# Patient Record
Sex: FEMALE | Race: WHITE | URBAN - METROPOLITAN AREA
[De-identification: names, ages, dates, MRNs, and addresses within clinical notes are randomized per-mention and may not be internally consistent; named-entity substitution may affect disease eponyms.]

---

## 2022-10-24 ENCOUNTER — OFFICE VISIT (OUTPATIENT)
Dept: URBAN - METROPOLITAN AREA CLINIC 86 | Facility: CLINIC | Age: 47
End: 2022-10-24

## 2022-10-24 ENCOUNTER — WEB ENCOUNTER (OUTPATIENT)
Dept: URBAN - METROPOLITAN AREA CLINIC 86 | Facility: CLINIC | Age: 47
End: 2022-10-24

## 2022-10-24 VITALS
WEIGHT: 179.2 LBS | DIASTOLIC BLOOD PRESSURE: 55 MMHG | HEIGHT: 70 IN | TEMPERATURE: 97.2 F | BODY MASS INDEX: 25.65 KG/M2 | HEART RATE: 66 BPM | SYSTOLIC BLOOD PRESSURE: 109 MMHG

## 2022-10-24 DIAGNOSIS — R93.2 ABNORMAL FINDING ON IMAGING OF LIVER: ICD-10-CM

## 2022-10-24 DIAGNOSIS — Z12.11 SCREENING FOR COLON CANCER: ICD-10-CM

## 2022-10-24 DIAGNOSIS — K76.89 HEPATIC CYST: ICD-10-CM

## 2022-10-24 DIAGNOSIS — Z71.85 VACCINE COUNSELING: ICD-10-CM

## 2022-10-24 RX ORDER — SERTRALINE HYDROCHLORIDE 50 MG/1
1 TABLET TABLET, FILM COATED ORAL ONCE A DAY
Status: ACTIVE | COMMUNITY

## 2022-10-24 NOTE — PHYSICAL EXAM HENT:
Head,  normocephalic,  atraumatic,  Face,  Face within normal limits,  Ears,  External ears within normal limits,  Nose/Nasopharynx and mouth: patient wearing mask

## 2022-10-24 NOTE — HPI-TODAY'S VISIT:
This is a 47 year old female referred by Prasanna Nielsen, for evaluation of cysts on the liver.  10/24/22 office visit  Pt with pmh including anxiety/depression presents today due to liver cyst cound on recent CT scan. Scan was done due to rlq/suprapubic pain, hematuria.  Labs; none [facility] imaging: CT done with [facility] viewed on pts phone showing 10 mm liver cyst. No suspicious features and otherwise unremarkable. will request records.   PMH: anxiety/depression Social hx wine nightly and discussed. Surgeries: none FH: denies fh of liver diease, gi disease   Discussed dx and that do not feel it is causing her pain given pain is in right lower quadrant/suprapublic area. Will check labs to rule out silent causes for liver disease and given no suspicious features will repeat us in 6 months to evaluate

## 2022-10-25 ENCOUNTER — TELEPHONE ENCOUNTER (OUTPATIENT)
Dept: URBAN - METROPOLITAN AREA CLINIC 86 | Facility: CLINIC | Age: 47
End: 2022-10-25

## 2022-10-25 PROBLEM — 443913008: Status: ACTIVE | Noted: 2022-10-23

## 2022-10-25 LAB
A/G RATIO: 1.6
ABSOLUTE BASOPHILS: 39
ABSOLUTE EOSINOPHILS: 143
ABSOLUTE LYMPHOCYTES: 1562
ABSOLUTE MONOCYTES: 506
ABSOLUTE NEUTROPHILS: 3251
ALBUMIN: 4.1
ALKALINE PHOSPHATASE: 61
ALT (SGPT): 15
AST (SGOT): 18
BASOPHILS: 0.7
BILIRUBIN, TOTAL: 0.4
BUN/CREATININE RATIO: (no result)
BUN: 19
CALCIUM: 9.3
CARBON DIOXIDE, TOTAL: 27
CHLORIDE: 106
CREATININE: 0.82
EGFR: 89
EOSINOPHILS: 2.6
GLOBULIN, TOTAL: 2.5
GLUCOSE: 87
HEMATOCRIT: 39.2
HEMOGLOBIN: 12.9
HEPATITIS A AB, TOTAL: REACTIVE
HEPATITIS B CORE AB TOTAL: (no result)
HEPATITIS B SURFACE AB IMMUNITY, QN: <5
HEPATITIS B SURFACE ANTIGEN: (no result)
HEPATITIS C ANTIBODY: (no result)
LYMPHOCYTES: 28.4
MCH: 29.3
MCHC: 32.9
MCV: 89.1
MONOCYTES: 9.2
MPV: 11.1
NEUTROPHILS: 59.1
PLATELET COUNT: 289
POTASSIUM: 4.3
PROTEIN, TOTAL: 6.6
RDW: 11.9
RED BLOOD CELL COUNT: 4.4
SIGNAL TO CUT-OFF: 0.05
SODIUM: 140
WHITE BLOOD CELL COUNT: 5.5

## 2022-11-08 ENCOUNTER — OFFICE VISIT (OUTPATIENT)
Dept: URBAN - METROPOLITAN AREA CLINIC 91 | Facility: CLINIC | Age: 47
End: 2022-11-08

## 2022-11-21 ENCOUNTER — OFFICE VISIT (OUTPATIENT)
Dept: URBAN - METROPOLITAN AREA CLINIC 92 | Facility: CLINIC | Age: 47
End: 2022-11-21
Payer: COMMERCIAL

## 2022-11-21 VITALS
HEIGHT: 70 IN | BODY MASS INDEX: 25.34 KG/M2 | WEIGHT: 177 LBS | HEART RATE: 77 BPM | TEMPERATURE: 97 F | SYSTOLIC BLOOD PRESSURE: 103 MMHG | DIASTOLIC BLOOD PRESSURE: 63 MMHG

## 2022-11-21 DIAGNOSIS — Z12.11 COLON CANCER SCREENING: ICD-10-CM

## 2022-11-21 PROCEDURE — 99212 OFFICE O/P EST SF 10 MIN: CPT

## 2022-11-21 RX ORDER — SERTRALINE HYDROCHLORIDE 50 MG/1
1 TABLET TABLET, FILM COATED ORAL ONCE A DAY
Status: ACTIVE | COMMUNITY

## 2022-11-21 NOTE — HPI-TODAY'S VISIT:
47 year-old female who presents for a colon cancer screening.   Last colonoscopy: never  No family history of colon polyps or colon cancer.  Patient denies nausea, heartburn, dysphagia, abdominal pain, rectal bleeding, melena, unintentional weight loss, changes in bowel habits and loss of appetite. Has 2 BM on avg, normal. Stacy denies blood thinner use, pacemaker/defibrillator, diabetes, kidney disease, and home O2.  She is seeing liver center for liver cyst recently noted on CT.

## 2022-12-02 ENCOUNTER — DASHBOARD ENCOUNTERS (OUTPATIENT)
Age: 47
End: 2022-12-02

## 2022-12-06 ENCOUNTER — OFFICE VISIT (OUTPATIENT)
Dept: URBAN - METROPOLITAN AREA CLINIC 86 | Facility: CLINIC | Age: 47
End: 2022-12-06

## 2022-12-06 VITALS — HEIGHT: 70 IN | WEIGHT: 177 LBS | BODY MASS INDEX: 25.34 KG/M2

## 2022-12-06 RX ORDER — SERTRALINE HYDROCHLORIDE 50 MG/1
1 TABLET TABLET, FILM COATED ORAL ONCE A DAY
Status: ACTIVE | COMMUNITY

## 2022-12-23 PROBLEM — 305058001: Status: ACTIVE | Noted: 2022-10-24

## 2023-01-13 ENCOUNTER — WEB ENCOUNTER (OUTPATIENT)
Dept: URBAN - METROPOLITAN AREA SURGERY CENTER 16 | Facility: SURGERY CENTER | Age: 48
End: 2023-01-13

## 2023-01-19 ENCOUNTER — CLAIMS CREATED FROM THE CLAIM WINDOW (OUTPATIENT)
Dept: URBAN - METROPOLITAN AREA SURGERY CENTER 16 | Facility: SURGERY CENTER | Age: 48
End: 2023-01-19
Payer: COMMERCIAL

## 2023-01-19 ENCOUNTER — OFFICE VISIT (OUTPATIENT)
Dept: URBAN - METROPOLITAN AREA SURGERY CENTER 16 | Facility: SURGERY CENTER | Age: 48
End: 2023-01-19

## 2023-01-19 DIAGNOSIS — Z12.11 COLON CANCER SCREENING: ICD-10-CM

## 2023-01-19 PROCEDURE — 45378 DIAGNOSTIC COLONOSCOPY: CPT | Performed by: INTERNAL MEDICINE

## 2023-01-19 PROCEDURE — G8907 PT DOC NO EVENTS ON DISCHARG: HCPCS | Performed by: INTERNAL MEDICINE

## 2023-01-19 RX ORDER — SERTRALINE HYDROCHLORIDE 50 MG/1
1 TABLET TABLET, FILM COATED ORAL ONCE A DAY
Status: ACTIVE | COMMUNITY

## 2023-02-03 PROBLEM — 442684004: Status: ACTIVE | Noted: 2022-10-23

## 2023-02-03 PROBLEM — 85057007: Status: ACTIVE | Noted: 2022-10-24

## 2023-02-10 ENCOUNTER — OFFICE VISIT (OUTPATIENT)
Dept: URBAN - METROPOLITAN AREA CLINIC 92 | Facility: CLINIC | Age: 48
End: 2023-02-10

## 2023-04-13 ENCOUNTER — OFFICE VISIT (OUTPATIENT)
Dept: URBAN - METROPOLITAN AREA CLINIC 91 | Facility: CLINIC | Age: 48
End: 2023-04-13

## 2023-04-24 ENCOUNTER — LAB OUTSIDE AN ENCOUNTER (OUTPATIENT)
Dept: URBAN - METROPOLITAN AREA CLINIC 86 | Facility: CLINIC | Age: 48
End: 2023-04-24